# Patient Record
Sex: MALE
[De-identification: names, ages, dates, MRNs, and addresses within clinical notes are randomized per-mention and may not be internally consistent; named-entity substitution may affect disease eponyms.]

---

## 2020-07-20 PROBLEM — Z00.00 ENCOUNTER FOR PREVENTIVE HEALTH EXAMINATION: Status: ACTIVE | Noted: 2020-07-20

## 2020-07-21 ENCOUNTER — APPOINTMENT (OUTPATIENT)
Dept: PLASTIC SURGERY | Facility: CLINIC | Age: 20
End: 2020-07-21
Payer: MEDICAID

## 2020-07-21 VITALS — WEIGHT: 138 LBS | BODY MASS INDEX: 22.99 KG/M2 | HEIGHT: 65 IN

## 2020-07-21 DIAGNOSIS — Z78.9 OTHER SPECIFIED HEALTH STATUS: ICD-10-CM

## 2020-07-22 NOTE — ASSESSMENT
[FreeTextEntry1] : Pt was seen and examined together by HENRY Bartholomew and Dr. Ed Potts. Assessment and plan formulated and discussed at time of visit.\par

## 2020-07-22 NOTE — HISTORY OF PRESENT ILLNESS
[FreeTextEntry1] : 20 years old Patient presents to the office for a septo/rhino consult.\par Pt states nose is crooked. Pt reports breaking nose as a toddler  and reports past Hx of trauma and break playing soccer at the age of 11 which further altered the shape of the nose.  denies any treatment for previous fractures. .Pt reports h/o difficulty breathing for many years which is exacerbated with sports and soccer. Pt reports chronic rhinitis and nasal obstruction and sinus infections. Pt has been seen, evaluated and treated by ENT several times. Pt has tried antihistamines, nasal steroid sprays, and even ballooning to open up nasal airway passages. Pt denies improvement. Sister with deviated septum and split septum as well.  \par

## 2020-08-09 DIAGNOSIS — Z01.818 ENCOUNTER FOR OTHER PREPROCEDURAL EXAMINATION: ICD-10-CM

## 2020-08-10 ENCOUNTER — APPOINTMENT (OUTPATIENT)
Dept: DISASTER EMERGENCY | Facility: CLINIC | Age: 20
End: 2020-08-10

## 2020-08-11 ENCOUNTER — TRANSCRIPTION ENCOUNTER (OUTPATIENT)
Age: 20
End: 2020-08-11

## 2020-08-11 LAB — SARS-COV-2 N GENE NPH QL NAA+PROBE: NOT DETECTED

## 2020-08-12 ENCOUNTER — APPOINTMENT (OUTPATIENT)
Dept: PLASTIC SURGERY | Facility: HOSPITAL | Age: 20
End: 2020-08-12
Payer: MEDICAID

## 2020-08-16 RX ORDER — OXYCODONE 5 MG/1
5 TABLET ORAL EVERY 4 HOURS
Qty: 10 | Refills: 0 | Status: DISCONTINUED | COMMUNITY
Start: 2020-08-16 | End: 2020-08-16

## 2020-08-16 RX ORDER — OXYCODONE AND ACETAMINOPHEN 5; 325 MG/1; MG/1
5-325 TABLET ORAL
Qty: 10 | Refills: 0 | Status: ACTIVE | COMMUNITY
Start: 2020-08-16 | End: 1900-01-01

## 2020-08-18 ENCOUNTER — APPOINTMENT (OUTPATIENT)
Dept: PLASTIC SURGERY | Facility: CLINIC | Age: 20
End: 2020-08-18
Payer: MEDICAID

## 2020-08-18 DIAGNOSIS — K59.03 DRUG INDUCED CONSTIPATION: ICD-10-CM

## 2020-08-18 RX ORDER — ONDANSETRON 4 MG/1
4 TABLET ORAL EVERY 8 HOURS
Qty: 6 | Refills: 0 | Status: ACTIVE | COMMUNITY
Start: 2020-08-18 | End: 1900-01-01

## 2020-08-18 RX ORDER — POLYETHYLENE GLYCOL 3350 17 G/17G
17 POWDER, FOR SOLUTION ORAL DAILY
Qty: 1 | Refills: 1 | Status: ACTIVE | COMMUNITY
Start: 2020-08-18 | End: 1900-01-01

## 2020-08-19 PROBLEM — K59.03 DRUG-INDUCED CONSTIPATION: Status: ACTIVE | Noted: 2020-08-18

## 2020-08-19 NOTE — HISTORY OF PRESENT ILLNESS
[FreeTextEntry1] : DOS 08/12/20 s/p Open septoplasty, rhinoplasty, cartilage grafting for correction of nasal obstruction, and turbinate reduction  with coblation.  Patient with history of acquired nasal deformity, secondary to previous fracture, nasal obstruction, severe septal deviation and turbinate hypertrophy.\par Here for follow up. pt c/o nausea, vomiting, headaches in posterior/occipital area. Pt seen at  [facility] ED sunday for nausea and dehydration. Given IV fluids. Pt also notes no BM in past 4 days. Pt c/o pain and dizziness. Reports improvement in breathing

## 2020-08-27 ENCOUNTER — APPOINTMENT (OUTPATIENT)
Dept: PLASTIC SURGERY | Facility: CLINIC | Age: 20
End: 2020-08-27
Payer: MEDICAID

## 2020-08-27 NOTE — HISTORY OF PRESENT ILLNESS
[FreeTextEntry1] : DOS 08/12/20 s/p Open septoplasty, rhinoplasty, cartilage grafting for correction of nasal obstruction, and turbinate reduction with coblation.\par Here for follow up.

## 2020-09-24 ENCOUNTER — APPOINTMENT (OUTPATIENT)
Dept: PLASTIC SURGERY | Facility: CLINIC | Age: 20
End: 2020-09-24
Payer: MEDICAID

## 2020-09-24 DIAGNOSIS — J34.2 DEVIATED NASAL SEPTUM: ICD-10-CM

## 2020-09-25 PROBLEM — J34.2 DEVIATED NASAL SEPTUM: Status: ACTIVE | Noted: 2020-07-22

## 2020-09-25 NOTE — HISTORY OF PRESENT ILLNESS
[FreeTextEntry1] : DOS 08/12/20 s/p Open septoplasty, rhinoplasty, cartilage grafting for correction of nasal obstruction, and turbinate reduction with coblation.\par Here for follow up.  Patient reports improvement in breathing from left side.  Still with some difficulty breathing from right side.  No nosebleeds.

## 2020-11-05 ENCOUNTER — APPOINTMENT (OUTPATIENT)
Dept: PLASTIC SURGERY | Facility: CLINIC | Age: 20
End: 2020-11-05

## 2020-11-19 ENCOUNTER — APPOINTMENT (OUTPATIENT)
Dept: PLASTIC SURGERY | Facility: CLINIC | Age: 20
End: 2020-11-19
Payer: MEDICAID

## 2020-11-19 DIAGNOSIS — J34.89 OTHER SPECIFIED DISORDERS OF NOSE AND NASAL SINUSES: ICD-10-CM

## 2020-11-29 PROBLEM — J34.89 NASAL OBSTRUCTION: Status: ACTIVE | Noted: 2020-07-22
